# Patient Record
Sex: FEMALE
[De-identification: names, ages, dates, MRNs, and addresses within clinical notes are randomized per-mention and may not be internally consistent; named-entity substitution may affect disease eponyms.]

---

## 2019-12-30 ENCOUNTER — HOSPITAL ENCOUNTER (INPATIENT)
Dept: HOSPITAL 97 - 2ND-WC | Age: 29
LOS: 3 days | Discharge: HOME | End: 2020-01-02
Attending: SPECIALIST | Admitting: SPECIALIST
Payer: COMMERCIAL

## 2019-12-30 VITALS — BODY MASS INDEX: 32.1 KG/M2

## 2019-12-30 DIAGNOSIS — Z3A.41: ICD-10-CM

## 2019-12-30 DIAGNOSIS — Z23: ICD-10-CM

## 2019-12-30 DIAGNOSIS — O61.0: ICD-10-CM

## 2019-12-30 LAB
HCT VFR BLD CALC: 39.9 % (ref 36–45)
LYMPHOCYTES # SPEC AUTO: 1.4 K/UL (ref 0.7–4.9)
PMV BLD: 8.8 FL (ref 7.6–11.3)
RBC # BLD: 4.46 M/UL (ref 3.86–4.86)
RPR SER QL: (no result)
UA COMPLETE W REFLEX CULTURE PNL UR: (no result)
UA DIPSTICK W REFLEX MICRO PNL UR: (no result)

## 2019-12-30 PROCEDURE — 85025 COMPLETE CBC W/AUTO DIFF WBC: CPT

## 2019-12-30 PROCEDURE — 3E033VJ INTRODUCTION OF OTHER HORMONE INTO PERIPHERAL VEIN, PERCUTANEOUS APPROACH: ICD-10-PCS

## 2019-12-30 PROCEDURE — 86850 RBC ANTIBODY SCREEN: CPT

## 2019-12-30 PROCEDURE — 88307 TISSUE EXAM BY PATHOLOGIST: CPT

## 2019-12-30 PROCEDURE — 36415 COLL VENOUS BLD VENIPUNCTURE: CPT

## 2019-12-30 PROCEDURE — 90707 MMR VACCINE SC: CPT

## 2019-12-30 PROCEDURE — 81003 URINALYSIS AUTO W/O SCOPE: CPT

## 2019-12-30 PROCEDURE — 81015 MICROSCOPIC EXAM OF URINE: CPT

## 2019-12-30 PROCEDURE — 3E0P7VZ INTRODUCTION OF HORMONE INTO FEMALE REPRODUCTIVE, VIA NATURAL OR ARTIFICIAL OPENING: ICD-10-PCS

## 2019-12-30 PROCEDURE — 86900 BLOOD TYPING SEROLOGIC ABO: CPT

## 2019-12-30 PROCEDURE — 90471 IMMUNIZATION ADMIN: CPT

## 2019-12-30 PROCEDURE — 87340 HEPATITIS B SURFACE AG IA: CPT

## 2019-12-30 PROCEDURE — 86592 SYPHILIS TEST NON-TREP QUAL: CPT

## 2019-12-30 PROCEDURE — 86901 BLOOD TYPING SEROLOGIC RH(D): CPT

## 2019-12-30 NOTE — PREOPHP
Date of Admission:  2019



History Of Present Illness:  Bhavna is a 29-year-old   female,  1, para 0, now 
at approximately 41 weeks gestation.  She has been followed by me during this pregnancy without signi
ficant complications other than prolonged pregnancy.  She is admitted for indicated induction of labo
r.  Because of unfavorable cervix, we will use misoprostol induction 25 mg q.4 hours intravaginally. 
 Infant has been active.  She denies any vaginal bleeding or spotting.



Past Medical History And Family History:  Please see prenatal record.



Review of Systems:

She reports no recent cough, cold, fever, or chills.  No recent nausea or vomiting.  No breast knots 
or lumps.  No vaginal bleeding or spotting.  Baby has been active.  She denies any bowel or bladder i
ssues.



Physical Examination:

General:  Reveals pleasant  female, in no apparent distress. 

Neck:  Supple without adenopathy or thyromegaly. 

Lungs:  Clear. 

Cardiac:  Regular rate and rhythm without murmurs. 

Breasts:  Not examined. 

Abdomen:  Estimated fetal weight of approximately 7+ pounds. 

Pelvic:  Cervix noted to be closed, 50% effaced, vertex, -1 station.  Cervix is mid position. 

Extremities:  No cyanosis, clubbing, or edema.



Impression:  A 41-week pregnancy, unfavorable cervix.



Plan:  Patient will be admitted for Cytotec induction of labor.  She had an increased chance of cr
ela sections as discussed because of prolonged pregnancy.  She has signed a permit in my presence.





C[place]/STEFFEN

DD:  2019 16:38:21Voice ID:  [id number]

## 2019-12-31 NOTE — P.BOP
Preoperative diagnosis: 41wk pregnancy, FTP, NRFHT's


Postoperative diagnosis: Same, viable male infant


Primary procedure: 


Assistant: LUCIANA Chiang


Estimated blood loss: 900ml


Anesthesia: epidural


Complications: None


Drain(s): Urinary catheter


Transferred to: Other (273)


Condition: Good

## 2019-12-31 NOTE — P.PN
Date of Service: 12/31/19





SROM, epidural placed, cx now 3+cm/85%effaced, vtx, 0 station with bulging 

lower uterine segment.  Some variable decell's noted recently, will position, 

administer O2 and observe for progress and FHT reactivity. Patient tolerated exercise session well with no complaints

## 2020-01-01 LAB
HCT VFR BLD CALC: 30.8 % (ref 36–45)
LYMPHOCYTES # SPEC AUTO: 1.6 K/UL (ref 0.7–4.9)
PMV BLD: 8.6 FL (ref 7.6–11.3)
RBC # BLD: 3.48 M/UL (ref 3.86–4.86)

## 2020-01-01 RX ADMIN — OXYCODONE AND ACETAMINOPHEN PRN TAB: 5; 325 TABLET ORAL at 20:25

## 2020-01-01 NOTE — P.PN
Date of Service: 01/01/20


S-NO complaints


O-Afeb, vs stable, hct 30 post op, abdomen soft but some gas distention, 

bandage dry


A-Satisfactory


P-Ambulate, regular diet, Mylicon tabs, D/C IV and Goodson.

## 2020-01-02 VITALS — SYSTOLIC BLOOD PRESSURE: 121 MMHG | DIASTOLIC BLOOD PRESSURE: 80 MMHG | TEMPERATURE: 97.7 F

## 2020-01-02 RX ADMIN — OXYCODONE AND ACETAMINOPHEN PRN TAB: 5; 325 TABLET ORAL at 02:00

## 2020-01-02 RX ADMIN — OXYCODONE AND ACETAMINOPHEN PRN TAB: 5; 325 TABLET ORAL at 07:49

## 2020-01-03 NOTE — DS
Date of Discharge:  2020



Final Hospital Discharge Diagnoses:  41 weeks pregnancy, delivered.  Delivery by  section sec
ondary to failure to progress in labor with nonreassuring fetal heart rate.



Complications:  None.



Procedures:  Misoprostol induction of labor, Pitocin augmentation of labor, placement of epidural cat
heter, primary  section, delivery of viable male infant.



Hospital Course:  The patient is a 29-year-old   female,  1, para 0, at 41 week
s' gestation, admitted for misoprostol induction of labor secondary to prolonged pregnancy.  She reac
hed 5 cm cervical dilatation, did not progress and with nonreassuring fetal heart rate with intermitt
ent late type of decelerations.  She was delivered by primary  section with epidural anesthes
ia of 8 pounds 7 ounce male infant, Apgar 9 and 9.  She was dismissed on the second postoperative day
, ambulatory, on a select diet with routine post  section activity restrictions, to be seen b
ack in my office in 1 week, to continue taking her prenatal iron and vitamins and prescription for tr
amadol 50 mg #20 for pain relief.  Lab included an admission hemoglobin and hematocrit of 13.3 and 39
.9, dismissal 10.3 and  30.8.  She is Rh positive blood type and rubella immune.  She was dismissed w
ith the usual post  section activity restrictions.





[place]/STEFFEN

DD:  2020 07:42:52Voice ID:  [id number]

DT:  2020 08:27:43Report ID:  [id number]

## 2020-01-03 NOTE — OP
Surgeon:  Jose Roberto Michael MD



Preoperative Diagnoses:  41 week pregnancy, failure to progress in labor, nonreassuring fetal heart r
ate.



Procedures:  Epidural anesthesia, primary  section, delivery of viable male infant.



Postoperative Diagnosis:  41 week pregnancy, failure to progress in labor, nonreassuring fetal heart 
rate.



Description Of Procedure:  After satisfactory level of epidural anesthesia was obtained, the patient 
had received 2 g of Ancef for antibiotic prophylaxis.  Vaginal prep with Betadine was performed.  Fol
ey catheter was in place.  A Pfannenstiel skin incision was made, carried down to the fascia, fascia 
incised with a combination of sharp and blunt dissection.  This was  from the underlying rec
tus muscles, these were  in the midline.  The peritoneum identified and incised.  Bladder fl
ap was developed.  A low-transverse uterine incision was made, 8 pounds 7 ounce male infant, Apgar 9 
and 9 was delivered.  Cord was clamped, cut, and the infant placed in a warmer.  Cord blood was obtai
rod.  The placenta was manually removed.  The uterus was then exteriorized.  The uterus was closed in
 2 layers with running nonlocking suture of 0 Vicryl.  The second layer used to imbricate the first. 
 3-0 Vicryl suture was used to reapproximate the bladder flap.  The uterus was returned to the perito
maritza cavity, which was cleaned of amniotic fluid, debris, and blood clot.  The rectus muscles were ap
proximated in midline with simple sutures of 0 Vicryl.  The fascia was closed with a running suture o
f #1 Vicryl from either margin to the middle.  The skin was closed with simple subcutaneous sutures w
ith 3-0 Vicryl, subdermal suture of 3-0 Vicryl, subcuticular suture of 4-0 Monocryl.  Patient was casandra
en to recovery room in satisfactory condition with SCDs in place.  Goodson catheter in place.  Sponge a
nd needle counts were correct x2.



Assistant Surgeon:  Dr. Pollock.



Anesthesia:  Dr. Gallardo.



Quantitative Blood Loss:  900 mL.  



She received 1 dose of methargen postdelivery because of the failure to progress and concern for uter
ine atony.





MPG/MODL

DD:  2020 07:28:45Voice ID:  [id number]

DT:  2020 08:19:05Report ID:  [id number]